# Patient Record
Sex: FEMALE | Race: WHITE | NOT HISPANIC OR LATINO
[De-identification: names, ages, dates, MRNs, and addresses within clinical notes are randomized per-mention and may not be internally consistent; named-entity substitution may affect disease eponyms.]

---

## 2022-05-09 PROBLEM — Z00.00 ENCOUNTER FOR PREVENTIVE HEALTH EXAMINATION: Status: ACTIVE | Noted: 2022-05-09

## 2022-05-16 ENCOUNTER — APPOINTMENT (OUTPATIENT)
Dept: ORTHOPEDIC SURGERY | Facility: CLINIC | Age: 52
End: 2022-05-16
Payer: COMMERCIAL

## 2022-05-16 VITALS — BODY MASS INDEX: 32.2 KG/M2 | HEIGHT: 62 IN | WEIGHT: 175 LBS

## 2022-05-16 DIAGNOSIS — M70.62 TROCHANTERIC BURSITIS, LEFT HIP: ICD-10-CM

## 2022-05-16 DIAGNOSIS — I10 ESSENTIAL (PRIMARY) HYPERTENSION: ICD-10-CM

## 2022-05-16 PROCEDURE — J3490M: CUSTOM

## 2022-05-16 PROCEDURE — 99214 OFFICE O/P EST MOD 30 MIN: CPT | Mod: 25

## 2022-05-16 PROCEDURE — 20610 DRAIN/INJ JOINT/BURSA W/O US: CPT

## 2022-05-16 PROCEDURE — 73502 X-RAY EXAM HIP UNI 2-3 VIEWS: CPT | Mod: LT

## 2022-05-16 NOTE — PROCEDURE
[Large Joint Injection] : Large joint injection [Left] : of the left [Greater Trochanteric Bursa] : greater trochanteric bursa [Pain] : pain [Inflammation] : inflammation [Alcohol] : alcohol [Betadine] : betadine [Ethyl Chloride sprayed topically] : ethyl chloride sprayed topically [Sterile technique used] : sterile technique used [___ cc    0.5%] : Bupivacaine (Marcaine) ~Vcc of 0.5%  [___ cc    40mg] : Triamcinolone (Kenalog) ~Vcc of 40 mg  [] : Patient tolerated procedure well [Call if redness, pain or fever occur] : call if redness, pain or fever occur [Apply ice for 15min out of every hour for the next 12-24 hours as tolerated] : apply ice for 15 minutes out of every hour for the next 12-24 hours as tolerated [Patient was advised to rest the joint(s) for ____ days] : patient was advised to rest the joint(s) for [unfilled] days [Previous OTC use and PT nontherapeutic] : patient has tried OTC's including aspirin, Ibuprofen, Aleve, etc or prescription NSAIDS, and/or exercises at home and/or physical therapy without satisfactory response [Risks, benefits, alternatives discussed / Verbal consent obtained] : the risks benefits, and alternatives have been discussed, and verbal consent was obtained

## 2022-05-16 NOTE — REASON FOR VISIT
[FreeTextEntry2] : 05/16/2022 Follow up for Left hip > right hip radiating pain.Last cortisone  11/ 29/ 2021

## 2022-05-16 NOTE — PHYSICAL EXAM
[2+] : posterior tibialis pulse: 2+ [Left] : left hip with pelvis [Mild arthritis (Tonnis Grade 1)] : Mild arthritis (Tonnis Grade 1) [] : non-antalgic

## 2022-05-16 NOTE — ASSESSMENT
[FreeTextEntry1] : Flare up of her left hip troch bursitis. Some aggravation from mild hip OA. Repeat L hip troch CSI today, tolerated well. f/up prn

## 2022-05-16 NOTE — HISTORY OF PRESENT ILLNESS
[Gradual] : gradual [Result of repetitive motion] : result of repetitive motion [8] : 8 [Radiating] : radiating [Sharp] : sharp [Stabbing] : stabbing [Constant] : constant [Rest] : rest [Ice] : ice [Injection therapy] : injection therapy [Sitting] : sitting [Standing] : standing [Walking] : walking [Bending forward] : bending forward [Stairs] : stairs [Lying in bed] : lying in bed [de-identified] : 5/16/22: Here for follow up of her left hip. She admits to returning lateral hip pain over the past couple of months. CSI from November 2021 was beneficial for a few months. She is now complaining of left groin pain. She had tried PT for her troch bursitis in the past to little relief.\par \par 11/29/21: 50 yo F with bilateral L>R hip pain for the past 2 years with no specific injury. He saw Dr. Herrera\par for this issue 10/2020. Tried PT and Mobic to no benefit. Denies groin pain. [] : no [FreeTextEntry7] : inside of groin and down pt leg on left hip

## 2022-05-16 NOTE — DISCUSSION/SUMMARY
[de-identified] : The natural progression of Osteoarthritis was explained to the patient.  We discussed the possible treatment options from conservative to operative.  These included NSAIDs, Glucosamine and Chondrotin sulfate, and Physical Therapy as well different types of injections.  We also discussed that at some point they may progress to needed a MAGALI.  Information and pamphlets were given when appropriate.\par The risks, benefits, contents and alternatives to injection were explained in full to the patient.  Risks outlined include but are not limited to infection, sepsis, bleeding, scarring, skin discoloration, temporary increase in pain, syncopal episode, failure to resolve symptoms, allergic reaction, flare reaction, permanent white skin discoloration, symptom recurrence, and elevation of blood sugar in diabetics.  Patient understood the risks.  All questions were answered.  After discussion of options, patient requested an injection.  Oral informed consent was obtained and sterile prep was done of the injection site.  Sterile technique was used to introduce the mixture.  Patient tolerated the procedure well.  Patient advised to ice the injection site this evening.  Signs and symptoms of infection reviewed and patient advised to call immediately for redness, fevers, and/or chills. \par Progress note completed by Elise Schwarz PA-C.